# Patient Record
Sex: FEMALE | Race: WHITE | NOT HISPANIC OR LATINO | Employment: FULL TIME | URBAN - METROPOLITAN AREA
[De-identification: names, ages, dates, MRNs, and addresses within clinical notes are randomized per-mention and may not be internally consistent; named-entity substitution may affect disease eponyms.]

---

## 2024-09-10 ENCOUNTER — OFFICE VISIT (OUTPATIENT)
Dept: CARDIOLOGY | Facility: HOSPITAL | Age: 34
End: 2024-09-10
Payer: COMMERCIAL

## 2024-09-10 VITALS
HEART RATE: 70 BPM | OXYGEN SATURATION: 98 % | WEIGHT: 152 LBS | BODY MASS INDEX: 25.33 KG/M2 | SYSTOLIC BLOOD PRESSURE: 90 MMHG | HEIGHT: 65 IN | DIASTOLIC BLOOD PRESSURE: 52 MMHG

## 2024-09-10 DIAGNOSIS — R00.2 PALPITATIONS: Primary | ICD-10-CM

## 2024-09-10 PROCEDURE — 99214 OFFICE O/P EST MOD 30 MIN: CPT | Performed by: INTERNAL MEDICINE

## 2024-09-10 PROCEDURE — 99204 OFFICE O/P NEW MOD 45 MIN: CPT | Performed by: INTERNAL MEDICINE

## 2024-09-10 PROCEDURE — 93005 ELECTROCARDIOGRAM TRACING: CPT | Performed by: INTERNAL MEDICINE

## 2024-09-10 PROCEDURE — 93010 ELECTROCARDIOGRAM REPORT: CPT | Performed by: STUDENT IN AN ORGANIZED HEALTH CARE EDUCATION/TRAINING PROGRAM

## 2024-09-10 PROCEDURE — 3008F BODY MASS INDEX DOCD: CPT | Performed by: INTERNAL MEDICINE

## 2024-09-10 RX ORDER — DAPAGLIFLOZIN 10 MG/1
10 TABLET, FILM COATED ORAL
COMMUNITY
Start: 2024-09-03 | End: 2025-09-03

## 2024-09-10 RX ORDER — METOPROLOL SUCCINATE 50 MG/1
50 TABLET, EXTENDED RELEASE ORAL 2 TIMES DAILY
COMMUNITY

## 2024-09-10 RX ORDER — SPIRONOLACTONE 25 MG/1
25 TABLET ORAL
COMMUNITY
Start: 2024-09-04 | End: 2025-09-04

## 2024-09-10 RX ORDER — FUROSEMIDE 20 MG/1
20 TABLET ORAL DAILY
COMMUNITY

## 2024-09-10 ASSESSMENT — COLUMBIA-SUICIDE SEVERITY RATING SCALE - C-SSRS
2. HAVE YOU ACTUALLY HAD ANY THOUGHTS OF KILLING YOURSELF?: NO
6. HAVE YOU EVER DONE ANYTHING, STARTED TO DO ANYTHING, OR PREPARED TO DO ANYTHING TO END YOUR LIFE?: NO
1. IN THE PAST MONTH, HAVE YOU WISHED YOU WERE DEAD OR WISHED YOU COULD GO TO SLEEP AND NOT WAKE UP?: NO

## 2024-09-10 ASSESSMENT — PATIENT HEALTH QUESTIONNAIRE - PHQ9
2. FEELING DOWN, DEPRESSED OR HOPELESS: NOT AT ALL
SUM OF ALL RESPONSES TO PHQ9 QUESTIONS 1 AND 2: 0
1. LITTLE INTEREST OR PLEASURE IN DOING THINGS: NOT AT ALL

## 2024-09-10 ASSESSMENT — ENCOUNTER SYMPTOMS
DEPRESSION: 0
OCCASIONAL FEELINGS OF UNSTEADINESS: 0
LOSS OF SENSATION IN FEET: 0

## 2024-09-10 ASSESSMENT — PAIN SCALES - GENERAL: PAINLEVEL: 0-NO PAIN

## 2024-09-10 NOTE — PROGRESS NOTES
Referred by  Dr Nando Braswell from [facility], consideration for REBIRTH trial   Minimal records available for my review , information obtained from her and from Dr Braswell's note when she was evaluated by him at [facility] .     History Of Present Illness:    Katerina Warner is a 34 y.o. woman recently diagnosed with post partum cardiomyopathy .  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   She is , She had an IVF pregnancy ( with Twins ) that was complicated by significant edema and shortness of breath in the third trimester along with episodes of Hypertension /preeclampsia  She presented with preeclampsia with severe features and needed to undergo an emergency C section on 2024 , noted to have been massively hypervolemic , needed to be transferred to the ICU secondary to Mental status changes and respiratory compromise   Had a RHC and placed on nipride , hemodynamics on Nitrates were RA=22 , RV=46/26 , PAP=48/22 , Wedge =31 , PA sat=70% , CI=3.5   Her course was complicated by PVC iinduced polymorphic VT on  that resulted in cardiac arrest with the need for CPR to be performed   She was started on GDMT and fitted for a lifevest to be worn on discharge along with eliquis for hypercoagulable postpartum state and cardiomyopathy   Interval history   Currently denies chest pain, shortness of breath, dyspnea on exertion, orthopnea, PND.  Reports a significant improvement in her leg edema   Compliant with her GDMT and tolerant to her current regimen     Patient reports intermittent palpitations.     Hospitalizations: Admitted for      Echocardiogram ( 2024 ) at [facility]   LV moderately dilated ( LVEDD of 5.6 cm ) and EF of 30% , elevated filling pressures  RV mildly dilated with mildly reduced systolic function   RA is mildly dilated  Moderate MR   Mild to moderate TR   Mildly elevated RVSP at 43 mmHg  "        Past Medical History:  She has a past medical history of Excessive and frequent menstruation with regular cycle (06/01/2020).    Past Surgical History:  She has a past surgical history that includes Other surgical history (06/01/2020) and Other surgical history (06/01/2020).      Social History:  She reports that she has never smoked. She has never used smokeless tobacco. She reports that she does not drink alcohol and does not use drugs.    Family History:  No family history on file.     Allergies:  Bee venom protein (honey bee) and Buspirone    Outpatient Medications:  Current Outpatient Medications   Medication Instructions    apixaban (ELIQUIS) 5 mg, oral, 2 times daily    dapagliflozin propanediol (FARXIGA) 10 mg, oral, Daily RT    furosemide (LASIX) 20 mg, oral, Daily    metoprolol succinate XL (TOPROL-XL) 50 mg, oral, 2 times daily, Do not crush or chew.    sacubitriL-valsartan (Entresto) 49-51 mg tablet 1 tablet, oral, 2 times daily    spironolactone (ALDACTONE) 25 mg, oral, Daily RT        Last Recorded Vitals:  Vitals:    09/10/24 1339   BP: 90/52   BP Location: Right arm   Patient Position: Sitting   BP Cuff Size: Large adult   Pulse: 70   SpO2: 98%   Weight: 68.9 kg (152 lb)   Height: 1.651 m (5' 5\")       Physical Exam:  GEN: NAD , AOX3  HEENT : JVP at the clavicle   Heart : regular rhythm , normal S1 and S2 , no murmurs   Lungs : clear , resonant , normal air entry bilaterally   Abdomen : soft , non tender   Ext: warm , well perfused , no edema   Neuro : grossly intact         Last Labs:  CBC -  No results found for: \"WBC\", \"HGB\", \"HCT\", \"MCV\", \"PLT\"    CMP -  No results found for: \"CALCIUM\", \"PHOS\", \"PROT\", \"ALBUMIN\", \"AST\", \"ALT\", \"ALKPHOS\", \"BILITOT\"    LIPID PANEL -   Lab Results   Component Value Date    CHOL 192 03/29/2021    TRIG 101 03/29/2021    HDL 55.3 03/29/2021    CHHDL 3.5 03/29/2021    LDLF 117 (H) 03/29/2021    VLDL 20 03/29/2021       RENAL FUNCTION PANEL -   No results found " "for: \"GLUCOSE\", \"NA\", \"K\", \"CL\", \"CO2\", \"ANIONGAP\", \"BUN\", \"CREATININE\", \"GFRMALE\", \"CALCIUM\", \"PHOS\", \"ALBUMIN\"     Lab Results   Component Value Date    HGBA1C 4.6 2024       Assessment/Plan     34 year old woman  , whose pregnancy was complicated by severe preeclampsia and post partum cardiomyopathy that required an ICU stay and complicated by a VT episode with subsequent cardiac arrest is here to enroll in the REBIRTH trial .   We consented her and she will be randomized   Will return next week for her first dose of the study drug   If her SBP is running less than 90 , we will have her hold SGLT2 I and spironolactone before the first dose of the study drug ( which can result in hypotension )   She will remain on anticoagulation for the duration of the trial   Will perform an echocardiogram or cardiac MRI in 3 months and an echocardiogram at month 6   Discussed the need for a reliable method of contraception that will be guided by GYN as the medications she is on are teratogenic and she is too high risk for a pregnancy at the moment     " 2

## 2024-09-10 NOTE — PATIENT INSTRUCTIONS
To reach Dr. Hilliard's office please call [phone] (Christopher). Fax [phone]. Call [phone] to schedule an appointment. You may also contact the HF RNs at [email or web address]     Thank you for coming to your appointment today. If you have any questions or need cardiac medication refills, please call the Heart Failure Office at [phone] option [phone].      No changes at this time   Please keep a log of blood pressures twice daily (Goal  with no symptoms)

## 2024-09-12 LAB
ATRIAL RATE: 69 BPM
P AXIS: 45 DEGREES
P OFFSET: 202 MS
P ONSET: 160 MS
PR INTERVAL: 122 MS
Q ONSET: 221 MS
QRS COUNT: 11 BEATS
QRS DURATION: 70 MS
QT INTERVAL: 420 MS
QTC CALCULATION(BAZETT): 450 MS
QTC FREDERICIA: 440 MS
R AXIS: 46 DEGREES
T AXIS: 97 DEGREES
T OFFSET: 431 MS
VENTRICULAR RATE: 69 BPM

## 2024-09-16 VITALS
DIASTOLIC BLOOD PRESSURE: 52 MMHG | WEIGHT: 152 LBS | SYSTOLIC BLOOD PRESSURE: 90 MMHG | HEART RATE: 70 BPM | OXYGEN SATURATION: 98 % | BODY MASS INDEX: 25.33 KG/M2 | HEIGHT: 65 IN

## 2024-09-24 ENCOUNTER — HOSPITAL ENCOUNTER (OUTPATIENT)
Dept: RESEARCH | Facility: HOSPITAL | Age: 34
Discharge: HOME | End: 2024-09-24
Payer: COMMERCIAL

## 2024-10-22 ENCOUNTER — HOSPITAL ENCOUNTER (OUTPATIENT)
Dept: RESEARCH | Facility: HOSPITAL | Age: 34
Discharge: HOME | End: 2024-10-22
Payer: COMMERCIAL

## 2024-11-05 ENCOUNTER — APPOINTMENT (OUTPATIENT)
Dept: CARDIOLOGY | Facility: HOSPITAL | Age: 34
End: 2024-11-05
Payer: COMMERCIAL

## 2024-11-21 ENCOUNTER — HOSPITAL ENCOUNTER (OUTPATIENT)
Dept: RADIOLOGY | Facility: HOSPITAL | Age: 34
Discharge: HOME | End: 2024-11-21
Payer: COMMERCIAL

## 2024-11-21 VITALS — HEIGHT: 65 IN | BODY MASS INDEX: 26.66 KG/M2 | WEIGHT: 160 LBS

## 2024-11-21 PROCEDURE — 2550000001 HC RX 255 CONTRASTS: Performed by: INTERNAL MEDICINE

## 2024-11-21 PROCEDURE — A9575 INJ GADOTERATE MEGLUMI 0.1ML: HCPCS | Performed by: INTERNAL MEDICINE

## 2024-11-21 PROCEDURE — 75561 CARDIAC MRI FOR MORPH W/DYE: CPT

## 2024-11-21 PROCEDURE — 75565 CARD MRI VELOC FLOW MAPPING: CPT | Performed by: STUDENT IN AN ORGANIZED HEALTH CARE EDUCATION/TRAINING PROGRAM

## 2024-11-21 PROCEDURE — 75561 CARDIAC MRI FOR MORPH W/DYE: CPT | Performed by: STUDENT IN AN ORGANIZED HEALTH CARE EDUCATION/TRAINING PROGRAM

## 2024-11-21 RX ORDER — GADOTERATE MEGLUMINE 376.9 MG/ML
28 INJECTION INTRAVENOUS
Status: COMPLETED | OUTPATIENT
Start: 2024-11-21 | End: 2024-11-21

## 2024-11-21 NOTE — PROGRESS NOTES
This was a brief telephone conversation with Mrs Chacon to enquire how she is doing after she reported another episode of hypotension while on the REBIRTH study drug , where her SBP dropped down to the 70's and she felt presyncopal .   She reports that she continues to be very interested in continuing with the REBIRTH trial and hopes that we can find a way whereby she can continue to take the study drug ( placebo vs bromocriptine )   I informed her that I would not recommend dropping her GDMT in favor of having her on the study drug as the GDMT for HF is guideline derived and proven to be beneficial versus the study drug .   Shared decision making with her : we decided that I will reach out to the Health system and get their input on whether or not we can maitain her on a reduced drug dose or reduced frequency since she has expressed a high interest to remain enrolled .   Overall she reported that she was feeling well today and had no complaints .   She appeared to be NYHA class II based on the description of her symptoms and exertional ability

## 2024-12-17 ENCOUNTER — APPOINTMENT (OUTPATIENT)
Dept: CARDIOLOGY | Facility: HOSPITAL | Age: 34
End: 2024-12-17
Payer: COMMERCIAL

## 2024-12-17 PROCEDURE — 99212 OFFICE O/P EST SF 10 MIN: CPT | Performed by: INTERNAL MEDICINE

## 2024-12-17 NOTE — PROGRESS NOTES
Chief Complaint:   Telephone virtual follow up      History Of Present Illness:    Katerina Warner is a 34 y.o. female presenting with post partum cardiomyopathy .  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   She is , She had an IVF pregnancy ( with Twins ) that was complicated by significant edema and shortness of breath in the third trimester along with episodes of Hypertension /preeclampsia  She presented with preeclampsia with severe features and needed to undergo an emergency C section on 2024 , noted to have been massively hypervolemic , needed to be transferred to the ICU secondary to Mental status changes and respiratory compromise   Had a RHC and placed on nipride , hemodynamics on Nitrates were RA=22 , RV=46/26 , PAP=48/22 , Wedge =31 , PA sat=70% , CI=3.5   Her course was complicated by PVC iinduced polymorphic VT on  that resulted in cardiac arrest with the need for CPR to be performed   She was started on GDMT and fitted for a lifevest to be worn on discharge along with eliquis for hypercoagulable postpartum state and cardiomyopathy   Interval history   She was enrolled in REBIRTH   Was intolerant to attempts to put her on the scheduled dose and frequency of study drug -hence the regimen was adjusted to accommodate her -with once daily and for less weeks   Cardiac MRI performed in 2024 showed that her EF had normalized   She reports feeling well - denies having any CV complaints   Per the description of her symptoms , appears to be NYHA class I   Is following clinically and closely with Dr Nando Braswell at [facility]   We will only see her for the purposes of the REBIRTH trial as the protocol entails     Hospitalizations: Admitted for         Past Medical History:  She has a past medical history of Excessive and frequent menstruation with regular cycle (2020).    Past Surgical History:  She has a past surgical  "history that includes Other surgical history (06/01/2020) and Other surgical history (06/01/2020).      Social History:  She reports that she has never smoked. She has never used smokeless tobacco. She reports that she does not drink alcohol and does not use drugs.    Family History:  No family history on file.     Allergies:  Bee venom protein (honey bee) and Buspirone    Outpatient Medications:  Current Outpatient Medications   Medication Instructions    apixaban (ELIQUIS) 5 mg, 2 times daily    dapagliflozin propanediol (FARXIGA) 10 mg, Daily RT    furosemide (LASIX) 20 mg, Daily    metoprolol succinate XL (TOPROL-XL) 50 mg, Daily    sacubitriL-valsartan (Entresto) 24-26 mg tablet 1 tablet, 2 times daily    spironolactone (ALDACTONE) 25 mg, Daily RT    Study REBIRTH bromocriptine/placebo 2.5 mg capsule Take (1) capsule (2.5 mg or placebo) TWO times a day for 2 weeks from 9/24/24 to 10/7/24, then take one (1) capsule once daily for 6 weeks from 10/8/24 to 11/18/24           LIPID PANEL -   Lab Results   Component Value Date    CHOL 192 03/29/2021    TRIG 101 03/29/2021    HDL 55.3 03/29/2021    CHHDL 3.5 03/29/2021    LDLF 117 (H) 03/29/2021    VLDL 20 03/29/2021       RENAL FUNCTION PANEL -   No results found for: \"GLUCOSE\", \"NA\", \"K\", \"CL\", \"CO2\", \"ANIONGAP\", \"BUN\", \"CREATININE\", \"GFRMALE\", \"CALCIUM\", \"PHOS\", \"ALBUMIN\"     Lab Results   Component Value Date    HGBA1C 4.6 08/29/2024           "

## 2025-03-18 ENCOUNTER — APPOINTMENT (OUTPATIENT)
Dept: CARDIOLOGY | Facility: HOSPITAL | Age: 35
End: 2025-03-18
Payer: COMMERCIAL

## 2025-03-25 ENCOUNTER — HOSPITAL ENCOUNTER (OUTPATIENT)
Dept: CARDIOLOGY | Facility: CLINIC | Age: 35
Discharge: HOME | End: 2025-03-25
Payer: COMMERCIAL

## 2025-03-25 LAB
AORTIC VALVE PEAK VELOCITY: 1.35 M/S
AV PEAK GRADIENT: 7 MMHG
AVA (PEAK VEL): 2.1 CM2
EJECTION FRACTION APICAL 4 CHAMBER: 74.8
EJECTION FRACTION: 68 %
LEFT ATRIUM VOLUME AREA LENGTH INDEX BSA: 22.3 ML/M2
LEFT VENTRICLE INTERNAL DIMENSION DIASTOLE: 4.67 CM (ref 3.5–6)
LEFT VENTRICULAR OUTFLOW TRACT DIAMETER: 1.78 CM
MITRAL VALVE E/A RATIO: 1.54
RIGHT VENTRICLE FREE WALL PEAK S': 11 CM/S
TRICUSPID ANNULAR PLANE SYSTOLIC EXCURSION: 1.8 CM

## 2025-03-25 PROCEDURE — 93306 TTE W/DOPPLER COMPLETE: CPT

## 2025-03-25 PROCEDURE — 93306 TTE W/DOPPLER COMPLETE: CPT | Performed by: INTERNAL MEDICINE

## 2025-04-08 ENCOUNTER — TELEMEDICINE (OUTPATIENT)
Dept: CARDIOLOGY | Facility: HOSPITAL | Age: 35
End: 2025-04-08
Payer: COMMERCIAL

## 2025-04-08 PROCEDURE — 1036F TOBACCO NON-USER: CPT | Performed by: INTERNAL MEDICINE

## 2025-04-08 NOTE — PROGRESS NOTES
Chief Complaint:   Telephone virtual follow up      History Of Present Illness:    Katerina Lees is a 34 y.o. female presenting with post partum cardiomyopathy .  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   She is , She had an IVF pregnancy ( with Twins ) that was complicated by significant edema and shortness of breath in the third trimester along with episodes of Hypertension /preeclampsia  She presented with preeclampsia with severe features and needed to undergo an emergency C section on 2024 , noted to have been massively hypervolemic , needed to be transferred to the ICU secondary to Mental status changes and respiratory compromise   Had a RHC and placed on nipride , hemodynamics on Nitrates were RA=22 , RV=46/26 , PAP=48/22 , Wedge =31 , PA sat=70% , CI=3.5   Her course was complicated by PVC iinduced polymorphic VT on  that resulted in cardiac arrest with the need for CPR to be performed   She was started on GDMT and fitted for a lifevest to be worn on discharge along with eliquis for hypercoagulable postpartum state and cardiomyopathy   She was enrolled in REBIRTH   Was intolerant to attempts to put her on the scheduled dose and frequency of study drug -hence the regimen was adjusted to accommodate her -with once daily and for less weeks   Cardiac MRI performed in 2024 showed that her EF had normalized   We only  see her for the purposes of the REBIRTH trial as the protocol entails       Interval history   She is doing well- has more energy and more active   Occasional fatigue - but overall appears to be NYHA class I  Her babies  are doing well   Sees Dr Braswell this week on Friday , she is concerned with the high cost of entresto, I advised her to visit with Dr Braswell and see if there are options for patient discounts , if  not she can be switched to an ACEI .   Spironolactone could also be held depending on her volume  status and BP- Would keep her on Beta blockers and ARNIs /or ACEIs, she has no plans of future pregnancies and we discussed the risk of a recurrence of her cardiomyopathy should she get pregnant again   BP stable and has increased Currently ranging in the high 110's systolically .  She does not need to be on eliquis , she completed the study drug , her EF normalized and she has no other indications for it- will discontinue it  Her repeat echocardiogram ( 2025 ) shows that her EF is 68%   Follow up per the REBIRTH protocol   Hospitalizations: Admitted for       Medications  Current Outpatient Medications   Medication Instructions    metoprolol succinate XL (TOPROL-XL) 50 mg, Daily    sacubitriL-valsartan (Entresto) 24-26 mg tablet 1 tablet, 2 times daily    spironolactone (ALDACTONE) 25 mg, Daily RT

## 2025-04-15 ENCOUNTER — APPOINTMENT (OUTPATIENT)
Dept: CARDIOLOGY | Facility: HOSPITAL | Age: 35
End: 2025-04-15
Payer: COMMERCIAL

## 2025-04-15 ENCOUNTER — TELEPHONE (OUTPATIENT)
Dept: CARDIOLOGY | Facility: HOSPITAL | Age: 35
End: 2025-04-15